# Patient Record
Sex: FEMALE | Race: WHITE | Employment: FULL TIME | URBAN - METROPOLITAN AREA
[De-identification: names, ages, dates, MRNs, and addresses within clinical notes are randomized per-mention and may not be internally consistent; named-entity substitution may affect disease eponyms.]

---

## 2021-06-02 ENCOUNTER — HOSPITAL ENCOUNTER (EMERGENCY)
Age: 31
Discharge: HOME | End: 2021-06-02
Attending: EMERGENCY MEDICINE
Payer: OTHER MISCELLANEOUS

## 2021-06-02 VITALS
DIASTOLIC BLOOD PRESSURE: 92 MMHG | HEART RATE: 98 BPM | SYSTOLIC BLOOD PRESSURE: 143 MMHG | WEIGHT: 173 LBS | RESPIRATION RATE: 16 BRPM | OXYGEN SATURATION: 98 % | TEMPERATURE: 98.2 F

## 2021-06-02 DIAGNOSIS — Z20.3 RABIES EXPOSURE: ICD-10-CM
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
## 2021-06-02 DIAGNOSIS — S51.852A CAT BITE OF FOREARM, LEFT, INITIAL ENCOUNTER: Primary | ICD-10-CM

## 2021-06-02 PROCEDURE — 90375 RABIES IG IM/SC: CPT | Performed by: STUDENT IN AN ORGANIZED HEALTH CARE EDUCATION/TRAINING PROGRAM

## 2021-06-02 PROCEDURE — 74011250636 HC RX REV CODE- 250/636: Performed by: STUDENT IN AN ORGANIZED HEALTH CARE EDUCATION/TRAINING PROGRAM

## 2021-06-02 PROCEDURE — 90471 IMMUNIZATION ADMIN: CPT

## 2021-06-02 PROCEDURE — 99281 EMR DPT VST MAYX REQ PHY/QHP: CPT

## 2021-06-02 PROCEDURE — 90675 RABIES VACCINE IM: CPT | Performed by: STUDENT IN AN ORGANIZED HEALTH CARE EDUCATION/TRAINING PROGRAM

## 2021-06-02 RX ADMIN — RABIES VACCINE 2.5 UNITS: KIT at 18:27

## 2021-06-02 RX ADMIN — RABIES IMMUNE GLOBULIN (HUMAN) 1560 UNITS: 300 INJECTION, SOLUTION INFILTRATION; INTRAMUSCULAR at 18:26

## 2021-06-02 NOTE — ED TRIAGE NOTES
She works at a [employer] and was bitten by a cat on her left forearm. She does not know the rabies hx of the cat.

## 2021-06-02 NOTE — PROGRESS NOTES
7:07 PM    CM notified of consult for follow-up rabies information. CM spoke with pt regarding follow-up. CM explained follow-up vaccines needed on Day 3- 6/5/21, Day 7- 6/9/21, and Day 14- 6/16/21. CM discussed follow-up vaccine appointments can be made at [facility] and also encouraged pt to contact insurance provider for most cost-effective provider for pt. Pt request for appointment to be made with [facility]. CM verified demographic information. Prescription to be copied and scanned into chart.      ELIANE Durham/NOAH  Care Management

## 2021-06-02 NOTE — DISCHARGE INSTRUCTIONS
Take Augmentin as prescribed. Please go to scheduled outpatient visits for additional rabies vaccines.

## 2021-06-02 NOTE — ED PROVIDER NOTES
Patient is a 27year old female who presents to ED c/o cat bite to left forearm which occurred 3.5 hours PTA. 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 Patient reports 1 puncture on dorsal aspect of forearm and 2 puncture marks on palmar aspect of left wrist. Patient reports cat is a stray and was acting \"weird\" when caring for him. Cat was not UTD on rabies vaccines and patient reports  advised she be treated for rabies. Patient was seen at [facility] earlier today and filled out paperwork with animal control. She was also given Augmentin 875mg for 10 days. Patient denies any additional injury, wrist pain, fever, or chills. History reviewed. No pertinent past medical history. History reviewed. No pertinent surgical history. No family history on file. Social History     Socioeconomic History    Marital status: SINGLE     Spouse name: Not on file    Number of children: Not on file    Years of education: Not on file    Highest education level: Not on file   Occupational History    Not on file   Tobacco Use    Smoking status: Current Every Day Smoker   Substance and Sexual Activity    Alcohol use: Not on file    Drug use: Not on file    Sexual activity: Not on file   Other Topics Concern    Not on file   Social History Narrative    Not on file     Social Determinants of Health     Financial Resource Strain:     Difficulty of Paying Living Expenses:    Food Insecurity:     Worried About Running Out of Food in the Last Year:     [address] in the Last Year:    Transportation Needs:     Lack of Transportation (Medical):      Lack of Transportation (Non-Medical):    Physical Activity:     Days of Exercise per Week:     Minutes of Exercise per Session:    Stress:     Feeling of Stress :    Social Connections:     Frequency of Communication with Friends and Family:     Frequency of Social Gatherings with Friends and Family:     Attends Spiritism Services:     Active Member of Clubs or Organizations:     Attends Club or Organization Meetings:     Marital Status:    Intimate Partner Violence:     Fear of Current or Ex-Partner:     Emotionally Abused:     Physically Abused:     Sexually Abused: ALLERGIES: Patient has no known allergies. Review of Systems   Constitutional: Negative for chills and fever. Musculoskeletal: Positive for arthralgias. Negative for back pain, gait problem, joint swelling, myalgias, neck pain and neck stiffness. Skin: Positive for wound. All other systems reviewed and are negative. Vitals:    06/02/21 1641 06/02/21 1716 06/02/21 1916   BP: (!) 143/92     Pulse: (!) 108  98   Resp: 16     Temp: 98.2 °F (36.8 °C)     SpO2: 98%  98%   Weight:  78.5 kg (173 lb)             Physical Exam  Vitals and nursing note reviewed. Constitutional:       Appearance: She is normal weight. HENT:      Head: Normocephalic and atraumatic. Nose: Nose normal.      Mouth/Throat:      Mouth: Mucous membranes are moist.   Eyes:      Conjunctiva/sclera: Conjunctivae normal.   Cardiovascular:      Rate and Rhythm: Regular rhythm. Tachycardia present. Pulses: Normal pulses. Heart sounds: Normal heart sounds. Pulmonary:      Effort: Pulmonary effort is normal.   Musculoskeletal:         General: Normal range of motion. Arms:       Cervical back: Normal range of motion and neck supple. Skin:     General: Skin is warm and dry. Capillary Refill: Capillary refill takes less than 2 seconds. Comments: 1 muncture wound to dorsal aspect of forearm and another puncture wound to ventral aspect of forearm. Not actively bleeding. No surrounding erythema, warmth or crepitus noted. Neurological:      General: No focal deficit present. Mental Status: She is alert. Mental status is at baseline.    Psychiatric:         Mood and Affect: Mood normal.          MDM  Number of Diagnoses or Management Options  Cat bite of forearm, left, initial encounter  Rabies exposure  Diagnosis management comments: Patient with cat bite while working at the [employer]. Cat was not up-to-date on vaccinations and acting weird. [facility] where she reported [facility]. Patient was given rabies immunoglobulin as well as vaccine in ED. Consulted case management who helped coordinate patient for additional rabies vaccine doses. Patient given prescription for Augmentin for 10 days from [facility].   Discussed plan for discharge with patient and need to follow-up as scheduled for additional rabies doses. Patient understands and agrees with plan. All questions addressed and answered. Strict return to ER precautions provided to patient.        Amount and/or Complexity of Data Reviewed  Discuss the patient with other providers: yes (Dr. France Hernandez, ED Attending )           Procedures

## 2021-06-05 ENCOUNTER — HOSPITAL ENCOUNTER (OUTPATIENT)
Dept: INFUSION THERAPY | Age: 31
Discharge: HOME | End: 2021-06-05
Payer: OTHER MISCELLANEOUS

## 2021-06-05 VITALS
SYSTOLIC BLOOD PRESSURE: 160 MMHG | RESPIRATION RATE: 18 BRPM | HEART RATE: 96 BPM | TEMPERATURE: 97.8 F | DIASTOLIC BLOOD PRESSURE: 90 MMHG

## 2021-06-05 PROCEDURE — 96372 THER/PROPH/DIAG INJ SC/IM: CPT

## 2021-06-05 PROCEDURE — 74011250636 HC RX REV CODE- 250/636: Performed by: EMERGENCY MEDICINE

## 2021-06-05 PROCEDURE — 90675 RABIES VACCINE IM: CPT | Performed by: EMERGENCY MEDICINE

## 2021-06-05 RX ADMIN — RABIES VACCINE 2.5 UNITS: KIT at 11:29

## 2021-06-05 NOTE — PROGRESS NOTES
Outpatient Infusion Center Short Visit Progress Note    1028 Pt admit to [facility] for Day 3 Rabies vaccine ambulatory in stable condition. Assessment completed. No new concerns voiced. Cat bite noted to left hand    Visit Vitals  BP (!) 160/90 (BP 1 Location: Right arm, BP Patient Position: At rest)   Pulse 96   Temp 97.8 °F (36.6 °C)   Resp 18   Breastfeeding No       Medications:  Medications Administered     rabies vaccine, [facility]CE[facility] (RABAVERT) kit 2.5 Units     Admin Date  06/05/2021 Action  Given Dose  2.5 Units Route  IntraMUSCular Administered By  Yadiel Menendez RN              Injection given IM in left arm    1130 Pt tolerated treatment well. D/c home ambulatory in no distress.  Pt aware of next appointment scheduled for 6/9/21

## 2021-06-09 ENCOUNTER — HOSPITAL ENCOUNTER (OUTPATIENT)
Dept: INFUSION THERAPY | Age: 31
Discharge: HOME | End: 2021-06-09
Payer: OTHER MISCELLANEOUS

## 2021-06-09 VITALS
DIASTOLIC BLOOD PRESSURE: 84 MMHG | HEART RATE: 116 BPM | RESPIRATION RATE: 16 BRPM | SYSTOLIC BLOOD PRESSURE: 129 MMHG | TEMPERATURE: 99.3 F

## 2021-06-09 PROCEDURE — 74011250636 HC RX REV CODE- 250/636: Performed by: EMERGENCY MEDICINE

## 2021-06-09 PROCEDURE — 90471 IMMUNIZATION ADMIN: CPT

## 2021-06-09 PROCEDURE — 90675 RABIES VACCINE IM: CPT | Performed by: EMERGENCY MEDICINE

## 2021-06-09 RX ADMIN — RABIES VACCINE 2.5 UNITS: KIT at 16:06

## 2021-06-09 NOTE — PROGRESS NOTES
[facility] Progress Note    Date: 2021    Name: Arabella Thomas    MRN: [id number]         : 1990    Ms. Shikha Christian Arrived ambulatory and in no distress for Rabies Vaccine IM. Assessment was completed, no acute issues at this time, no new complaints voiced. Ms. Beth Reyes vitals were reviewed. Visit Vitals  /84   Pulse (!) 116   Temp 99.3 °F (37.4 °C)   Resp 16     Medications:  Medications Administered     rabies vaccine, [facility]CE[facility] (RABAVERT) kit 2.5 Units     Admin Date  2021 Action  Given Dose  2.5 Units Route  IntraMUSCular Administered By  Sidney Troncoso RN              Ms. Shikha Christian tolerated treatment well and was discharged from [place] [id number] in stable condition at 1610. She is to return on  at 1700 for her next appointment.     Ya Hernandez RN  2021

## 2021-06-16 ENCOUNTER — HOSPITAL ENCOUNTER (OUTPATIENT)
Dept: INFUSION THERAPY | Age: 31
Discharge: HOME | End: 2021-06-16
Payer: OTHER MISCELLANEOUS

## 2021-06-16 VITALS
DIASTOLIC BLOOD PRESSURE: 82 MMHG | TEMPERATURE: 97.3 F | RESPIRATION RATE: 18 BRPM | SYSTOLIC BLOOD PRESSURE: 125 MMHG | HEART RATE: 96 BPM

## 2021-06-16 PROCEDURE — 90675 RABIES VACCINE IM: CPT | Performed by: STUDENT IN AN ORGANIZED HEALTH CARE EDUCATION/TRAINING PROGRAM

## 2021-06-16 PROCEDURE — 74011250636 HC RX REV CODE- 250/636: Performed by: STUDENT IN AN ORGANIZED HEALTH CARE EDUCATION/TRAINING PROGRAM

## 2021-06-16 PROCEDURE — 90471 IMMUNIZATION ADMIN: CPT

## 2021-06-16 RX ADMIN — Medication 2.5 UNITS: at 17:08

## 2021-06-16 NOTE — PROGRESS NOTES
Outpatient Infusion Center Short Visit Progress Note    1700 Pt admit to Health system for Day 14 Rabies vaccine ambulatory in stable condition. Assessment completed. No new concerns voiced. Cat bite noted to left hand    Visit Vitals  /82   Pulse 96   Temp 97.3 °F (36.3 °C)   Resp 18       Medications:  Rabavert Injection given IM in left arm    1710 Pt tolerated treatment well. D/c home ambulatory in no distress.